# Patient Record
Sex: FEMALE | Race: AMERICAN INDIAN OR ALASKA NATIVE
[De-identification: names, ages, dates, MRNs, and addresses within clinical notes are randomized per-mention and may not be internally consistent; named-entity substitution may affect disease eponyms.]

---

## 2022-06-20 ENCOUNTER — HOSPITAL ENCOUNTER (EMERGENCY)
Dept: HOSPITAL 5 - ED | Age: 87
Discharge: LEFT BEFORE DISCHARGE | End: 2022-06-20
Payer: SELF-PAY

## 2022-06-20 VITALS — DIASTOLIC BLOOD PRESSURE: 100 MMHG | SYSTOLIC BLOOD PRESSURE: 209 MMHG
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
## 2022-06-20 DIAGNOSIS — Z53.21: ICD-10-CM

## 2022-06-20 DIAGNOSIS — S00.03XA: Primary | ICD-10-CM
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
## 2022-06-20 PROCEDURE — 70450 CT HEAD/BRAIN W/O DYE: CPT

## 2022-06-20 NOTE — CAT SCAN REPORT
CT head/brain wo con



INDICATION / CLINICAL INFORMATION:

87 years Female; head injury.



TECHNIQUE: Routine CT head without contrast. All CT scans at this location are performed using CT dos
e reduction for ALARA by means of automated exposure control.



COMPARISON: 

None.



FINDINGS:



BRAIN / INTRACRANIAL CONTENTS: No acute hemorrhage, mass effect, midline shift, hydrocephalus, or acu
te, large territorial infarct.



Mild to moderate, diffuse cerebral and mild cerebellar atrophy.



There are moderate areas of decreased attenuation in the white matter of the cerebral hemispheres, as
 well as the gangliocapsular regions. These are nonspecific findings and may be related to microangio
orquidea (hypertension, diabetes, atherosclerosis), given the patient's age. It might be difficult to ev
aluate for small areas of ischemia without diffusion imaging by MRI.



CRANIOCERVICAL JUNCTION: No significant abnormality.



ORBITS: No significant abnormality of visualized orbits.

SINUSES / MASTOIDS: Visualized paranasal sinuses and mastoid air cells are essentially clear.



ADDITIONAL FINDINGS: Atherosclerotic disease is seen in the anterior and posterior circulation. 



IMPRESSION:

1. No focal mass, hemorrhage, hydrocephalus, or acute, large territorial infarct. 



Signer Name: Juma Larkin MD, III 

Signed: 6/20/2022 7:53 PM

Workstation Name: [id number]